# Patient Record
Sex: MALE | Race: BLACK OR AFRICAN AMERICAN | URBAN - METROPOLITAN AREA
[De-identification: names, ages, dates, MRNs, and addresses within clinical notes are randomized per-mention and may not be internally consistent; named-entity substitution may affect disease eponyms.]

---

## 2023-02-09 ENCOUNTER — CLINICAL DOCUMENTATION (OUTPATIENT)
Age: 58
End: 2023-02-09

## 2023-02-09 ENCOUNTER — DOCUMENTATION ONLY (OUTPATIENT)
Dept: PULMONOLOGY | Age: 58
End: 2023-02-09

## 2023-02-09 NOTE — PROGRESS NOTES
Left message re [place] [facility] for sleep eval. Any prior sleep studies/eval? VERIFY PHONE NUMBERS

## 2023-03-08 ENCOUNTER — OFFICE VISIT (OUTPATIENT)
Age: 58
End: 2023-03-08
Payer: OTHER GOVERNMENT

## 2023-03-08 VITALS
HEART RATE: 74 BPM | SYSTOLIC BLOOD PRESSURE: 151 MMHG | BODY MASS INDEX: 36.92 KG/M2 | HEIGHT: 71 IN | RESPIRATION RATE: 18 BRPM | DIASTOLIC BLOOD PRESSURE: 104 MMHG | TEMPERATURE: 97.5 F | OXYGEN SATURATION: 98 % | WEIGHT: 263.7 LBS

## 2023-03-08 DIAGNOSIS — I10 PRIMARY HYPERTENSION: ICD-10-CM

## 2023-03-08 DIAGNOSIS — J30.89 ALLERGIC RHINITIS DUE TO OTHER ALLERGIC TRIGGER, UNSPECIFIED SEASONALITY: ICD-10-CM

## 2023-03-08 DIAGNOSIS — G47.9 REPETITIVE INTRUSION OF SLEEP WITH ENVIRONMENTAL DISTURBANCES: ICD-10-CM

## 2023-03-08 DIAGNOSIS — R52 PAIN: ICD-10-CM

## 2023-03-08 DIAGNOSIS — R06.83 SNORING: Primary | ICD-10-CM

## 2023-03-08 DIAGNOSIS — G47.19 EXCESSIVE DAYTIME SLEEPINESS: ICD-10-CM

## 2023-03-08 DIAGNOSIS — R29.818 SUSPECTED SLEEP APNEA: ICD-10-CM

## 2023-03-08 DIAGNOSIS — Z86.59 HISTORY OF POSTTRAUMATIC STRESS DISORDER (PTSD): ICD-10-CM

## 2023-03-08 PROCEDURE — 99205 OFFICE O/P NEW HI 60 MIN: CPT | Performed by: INTERNAL MEDICINE

## 2023-03-08 PROCEDURE — 3078F DIAST BP <80 MM HG: CPT | Performed by: INTERNAL MEDICINE

## 2023-03-08 PROCEDURE — 3074F SYST BP LT 130 MM HG: CPT | Performed by: INTERNAL MEDICINE

## 2023-03-08 ASSESSMENT — PATIENT HEALTH QUESTIONNAIRE - PHQ9
SUM OF ALL RESPONSES TO PHQ QUESTIONS 1-9: 0
SUM OF ALL RESPONSES TO PHQ9 QUESTIONS 1 & 2: 0
2. FEELING DOWN, DEPRESSED OR HOPELESS: 0
1. LITTLE INTEREST OR PLEASURE IN DOING THINGS: 0
SUM OF ALL RESPONSES TO PHQ QUESTIONS 1-9: 0

## 2023-03-08 ASSESSMENT — SLEEP AND FATIGUE QUESTIONNAIRES
HOW LIKELY ARE YOU TO NOD OFF OR FALL ASLEEP WHILE LYING DOWN TO REST IN THE AFTERNOON WHEN CIRCUMSTANCES PERMIT: 3
HOW LIKELY ARE YOU TO NOD OFF OR FALL ASLEEP WHILE SITTING QUIETLY AFTER LUNCH WITHOUT ALCOHOL: 3
HOW LIKELY ARE YOU TO NOD OFF OR FALL ASLEEP WHILE WATCHING TV: 3
ESS TOTAL SCORE: 23
HOW LIKELY ARE YOU TO NOD OFF OR FALL ASLEEP WHILE SITTING AND TALKING TO SOMEONE: 3
HOW LIKELY ARE YOU TO NOD OFF OR FALL ASLEEP WHEN YOU ARE A PASSENGER IN A CAR FOR AN HOUR WITHOUT A BREAK: 2
HOW LIKELY ARE YOU TO NOD OFF OR FALL ASLEEP WHILE SITTING AND READING: 3
HOW LIKELY ARE YOU TO NOD OFF OR FALL ASLEEP IN A CAR, WHILE STOPPED FOR A FEW MINUTES IN TRAFFIC: 3
HOW LIKELY ARE YOU TO NOD OFF OR FALL ASLEEP WHILE SITTING INACTIVE IN A PUBLIC PLACE: 3
NECK CIRCUMFERENCE (INCHES): 17

## 2023-03-08 NOTE — PATIENT INSTRUCTIONS
Please call our clinic back at [phone] or send a message on [employer] if you have any questions or concerns or if you are experiencing any of the following:    You have not received a follow up appointment within 30 days prior the recommended follow up time.    If you are not tolerating treatment plan and/or not able to obtain equipment or prescribed medication(s).  if you are experiencing any difficulties with the Durable Medical Equipment  (DME) Company you may be using or is assigned to you.  Two weeks have passed and you have not received an appointment for a scheduled procedure.  Two weeks have passed since you underwent a test and/or procedure and you have not received your results.    If you are using a CPAP/BIPAP, or Home Ventilator Device- Please note the following.  Currently, many DMEs are experiencing supply chain difficulties and orders for equipment may be back logged several weeks to months.     Your  Durable Medical Equipment (DME ) company is supposed to provide you with replacement filters, tubing and masks. You can either call your DME when you need new supplies or you can arrange for an automatic shipment schedule.    Your need to be seen by our office at lat minimum of every 12 months in order to renew the prescription for these supplies.   Please make note of who your DME company is and their phone number.   Please make sure that you clean your mask and hosing on a regular basis.  Your DME can provide you with additional information regarding proper care and cleaning of your device           Safety is strongly encouraged.  You should not drive if sleepy, tired, distracted and/or  fatigued.       If a procedure or imaging study has been ordered for your by this clinic please call the [facility] at [facility] or [facility] at  [phone]    Chest Xrays and blood work do not require appointments.  They are considered \"Walk-In\" services and can be  obtained at either [facility] or [facility].     Blood work is performed at the Laboratory (Lab) from 08:00am - 04:00pm          -Thank you

## 2023-03-08 NOTE — PROGRESS NOTES
Barbara Montgomery presents today for   Chief Complaint   Patient presents with    Sleep Problem       Is someone accompanying this pt? No    Is the patient using any DME equipment during OV? No    -DME Company NA    Depression Screening:    PHQ-9 Questionaire 3/8/2023   Little interest or pleasure in doing things 0   Feeling down, depressed, or hopeless 0   PHQ-9 Total Score 0       Learning Needs Questionnaire:     No question data found. Fall Risk:     No flowsheet data found. Abuse Screening:     No flowsheet data found. Coordination of Care:    1. Have you been to the ER, urgent care clinic since your last visit? Hospitalized since your last visit? No    2. Have you seen or consulted any other health care providers outside of the [address] [place] [address] since your last visit? Include any pap smears or colon screening. No    Medication list has been update per patient.

## 2023-03-08 NOTE — PROGRESS NOTES
[facility]  Pulmonary, Critical Care, and Sleep Medicine    Office Progress Note- Initial Evaluation      Primary Care Physician: Unknown Unknown     Reason for Visit:  Evaluation for possible sleep breathing disorder.  [facility] Referral -     Assessment:  Snoring: Please try to add to blood in lab if possible, if not obtain at next blood draw- thank you  Excessive Daytime Sleepiness (EDS): [place]: 17 Suspect due to several factors.  Sleep environment, report of PTSD with vivid dreams and flashbacks, possible PRASHANT, pain, etc.  Report of PTSD- [place] [facility]. Reports ongoing vivid dreams and flashbacks  Hypertension: The patient 's blood pressure was elevated today in clinic.  The patient is asymptomatic. Patient believes he is taking Norvasc, but he is not sure.   Allergic Rhinitis  Pain: Leg cramps and shoulders  Poor Sleep Environment: [place].  Patient reports that trains pass throughout the night and wakes him from sleep.  Obesity: Body mass index is 36.78 kg/m².              Plan:  Medications has been reconciled to the best of our ability. I have encouraged patient to make a medication list or take a photo of his meds.     Schedule patient for Split sleep study for further evaluation. Patient is not a good HSAT candidate due to home environment and reported history of PTSD.  Potential consequences of untreated sleep apnea, and/or excessive daytime sleepiness were discussed with the patient.  Educational materials provided.  Treatment options including CPAP, dental appliance, weight reduction measures, positional therapy, surgeries etc were discussed. Based on our discussion, if the patient has PRASHANT, will pursue the following treatment modality PAP therapy  Healthy lifestyle changes to include weight loss and exercise discussed.  Healthy sleep habits were reviewed and encouraged. Patients are encouraged to obtain 7-9 hours of sleep per  day.   and workplace safety reviewed and discussed as appropriate. Drowsy and/or inattentive driving should be avoided. - This was stressed today in clinic. Follow up with Primary Care Provider (PCP) as directed and for routine health care maintenance. Patient to follow up with his [address] Examiner regarding this evaluation and workup. Follow-up: after sleep testing;   sooner should new symptoms or problems arise. History of Present Illness: Mr. Tamra Joel is a 59 y.o. male patient who has a history of hypertension, allergic rhinitis, and report of PTSD. The history was provided by the patient. The patient reports that he is taking one medication, but he is not sure of the name. He believes that it is Norvasc, 5mg. Our office has no medication list from the [facility] at this time. Occupation:   Current: [employer] (Since January 2019) , [address]. 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 He typically will take a brake when this starts to feel this way. Motor vehicle accident(s) associated with drowsy driving have not occurred. Snoring:  is a problem. This is a Chronic problem which has been ongoing for years. Witnessed apneas are reported. Fatigue:   is a problem. This is a Chronic problem which has been ongoing for years. Dental: Teeth clenching or grinding is unknown. Naps: are reported. Leg Symptoms: He does not have unpleasant or crawling sensation in legs or strong urge to move when inactive. Pain: Pain, typically does disturb their sleep: Leg cramps ( occurs approximately 1x/weekly), Both shoulders- Under medical evaluation    GERD: is not reported. Mood: The patient describes their mood as: Happy.     Sleep-Wake History:     Estimates sleeping approximately 6 hours per night/day. Reports sleeping in a Bed with 2 pillows under their head. He gets into bed at approximately 5525-2762 (depends on his work schedule and when he has to get up in the morning). Once in bed,he goes to sleep. It usually takes up to 20 minutes to fall asleep after going to bed. The patient reports that [place]. He reports that trains pass by his home during the night. He hears the sound and the vibrations. Ear plugs can reduce sound, but not vibration. He normally awakens with an alarm to start his day at 4002-1536. He  typically gets out of bed shortley after awakening . Reports waking up from sleep to use the bathroom 3-4 time(s). Vivid dreams are reported, which he attributes to PTSD from the [address]. He reports nightly dreams and daily flashbacks. He reports loosing several members in his unit and his dreams and flashbacks reflect that reported experience (s). Waking up from sleep with a headache is reported. Awakening with a dry mouth is reported. Symptom(s) suggestive of cataplexy are not  reported. Sleep paralysis is not reported. Hallucinations: are not reported. Sleep walking is not reported. Sleep talking is reported. Other unusual and/or parasomnia behaviors are reported. He was previously told by his ex-wife that he fights in his sleep. He does not have a consistent  bed partner.     Family Sleep History:   - Brother: PRASHANT and uses PAP Therapy          [facility]    Sleep Medicine 3/8/2023   Sitting and reading 3   Watching TV 3   Sitting, inactive in a public place (e.g. a theatre or a meeting) 3   As a passenger in a car for an hour without a break 2   Lying down to rest in the afternoon when circumstances permit 3   Sitting and talking to someone 3   Sitting quietly after a lunch without alcohol 3   In a car, while stopped for a few minutes in traffic 3   [place] Sleepiness Score 23   Neck circumference (Inches) 17       PHQ-9  3/8/2023   Little interest or pleasure in doing things 0   Feeling down, depressed, or hopeless 0   PHQ-2 Score 0   PHQ-9 Total Score 0            Immunization History: There is no immunization history on file for this patient. Past Medical History:  History reviewed. No pertinent past medical history. Past Surgical History:  No past surgical history on file. Family History:  No family history on file. Social History:  Social History     Socioeconomic History    Marital status: Single     Spouse name: None    Number of children: None    Years of education: None    Highest education level: None   Tobacco Use    Smoking status: Never    Smokeless tobacco: Never   Substance and Sexual Activity    Alcohol use: Not Currently    Drug use: Never           Caffeine Amount Time of last Intake Comments   Coffee Occasional     Soda None     Tea Occasional     Energy Drinks 1-2/week  5 hour shot   Over- the - counter stimulant pills None     Other Substances      Alcohol Occasional- social on weekends  Varies, but avoids beer   Tobacco None     Drugs None     Other: None         Medications:  No current outpatient medications on file. No current facility-administered medications for this visit.           Allergy:  No Known Allergies      Review of Systems  General ROS: positive for  - fatigue and sleep disturbance  negative for - chills, fever, hot flashes, malaise, or night sweats  ENT ROS: negative for - epistaxis, nasal polyps, oral lesions, sinus pain, sneezing, sore throat, tinnitus, or vertigo  Hematological and Lymphatic ROS: negative for - bleeding problems, blood clots, bruising, jaundice, pallor or swollen lymph nodes  Endocrine ROS: negative for - polydipsia/polyuria, skin changes, temperature intolerance or unexpected weight changes  Respiratory ROS: no cough, shortness of breath, or wheezing  Cardiovascular ROS: no chest pain or dyspnea on exertion  Gastrointestinal ROS: no abdominal pain, change in bowel habits, or black or bloody stools  Genito-Urinary ROS: no dysuria, trouble voiding, or hematuria  Musculoskeletal ROS: as noted above  Neurological ROS: no TIA or stroke symptoms  Dermatological ROS: negative for - pruritus, rash or skin lesion changes   Psychological ROS: as noted above   Otherwise negative and per HPI      Physical Exam:    Vitals:    03/08/23 1525   BP: (!) 150/95   Site: Left Upper Arm   Position: Sitting   Cuff Size: Large Adult   Pulse: 74   Resp: 18   Temp: 97.5 °F (36.4 °C)   TempSrc: Temporal   SpO2: 98%   Weight: 263 lb 11.2 oz (119.6 kg)   Height: 5' 11\" (1.803 m)    on RA, Body mass index is 36.78 kg/m². General: No distress, acyanotic, appears stated age, cooperative, pleasant, full goate present  HEENT: PERRL, EOMI, throat without erythema or exudate, Tongue- with dental indention on tongue, Mallampati's score 2+, Uvula- midline with mild edema, + retrognathia. + mild nasal congestion noted  Neck: Supple,  no abnormally enlarged lymph nodes, thyroid is not enlarged, non-tender, no JVD. No carotid bruits. Chest: Grossly normal.  Lungs: Bilateral breath sounds are present. Moderate air entry, clear to auscultation bilaterally. Heart: Regular rate and rhythm, S1S2 present, without murmur. Abdomen: Protuberant,soft, non-tender  Extremity: Negative for cyanosis, edema, or clubbing. Skin: Skin color, texture, turgor normal. No rashes or lesions. Neurological: CN 2-12 grossly intact, normal muscle tone.     Data Reviewed:  CBC: No results found for: NA, K, CL, CO2, BUN, CREATININE, GLUCOSE, CALCIUM       BMP: No results found for: NA, K, CL, CO2, BUN, CREATININE, GLUCOSE, CALCIUM       TSH:  No results found for: TSH, H4ARSAL, X4NASXX, THYROIDAB, FT3, T4FREE      Imaging:  [x]I have personally reviewed the patients radiographs section     No results found for this or any previous visit from the past 3650 days.     No results found for this or any previous visit from the past 3650 days. Cardiac Echo:     No results found for this or any previous visit.                    Historical Sleep Testing Data:        Analilia Christopher DO, [facility]  Pulmonary, Sleep and Critical Care Medicine

## 2023-03-10 ENCOUNTER — TELEPHONE (OUTPATIENT)
Dept: SLEEP MEDICINE | Facility: HOSPITAL | Age: 58
End: 2023-03-10

## 2023-03-10 NOTE — TELEPHONE ENCOUNTER
Attempted to sched sleep study appt, pt stated he was dealing with an emergency right now and requested to be called next week.  Tech assured pt we will call back next week to schedule and he voiced understanding./

## 2023-03-13 ENCOUNTER — TELEPHONE (OUTPATIENT)
Dept: SLEEP MEDICINE | Facility: HOSPITAL | Age: 58
End: 2023-03-13

## 2023-03-15 ENCOUNTER — TELEPHONE (OUTPATIENT)
Dept: SLEEP MEDICINE | Facility: HOSPITAL | Age: 58
End: 2023-03-15